# Patient Record
Sex: FEMALE | Race: WHITE | NOT HISPANIC OR LATINO | URBAN - METROPOLITAN AREA
[De-identification: names, ages, dates, MRNs, and addresses within clinical notes are randomized per-mention and may not be internally consistent; named-entity substitution may affect disease eponyms.]

---

## 2022-07-18 ENCOUNTER — OFFICE (OUTPATIENT)
Dept: URBAN - METROPOLITAN AREA CLINIC 9 | Facility: CLINIC | Age: 87
End: 2022-07-18

## 2022-07-18 VITALS
DIASTOLIC BLOOD PRESSURE: 71 MMHG | SYSTOLIC BLOOD PRESSURE: 165 MMHG | HEART RATE: 80 BPM | HEIGHT: 62 IN | DIASTOLIC BLOOD PRESSURE: 72 MMHG | OXYGEN SATURATION: 96 % | SYSTOLIC BLOOD PRESSURE: 171 MMHG | WEIGHT: 148 LBS

## 2022-07-18 DIAGNOSIS — K59.00 CONSTIPATION, UNSPECIFIED: ICD-10-CM

## 2022-07-18 DIAGNOSIS — E07.9 DISORDER OF THYROID, UNSPECIFIED: ICD-10-CM

## 2022-07-18 DIAGNOSIS — E78.00 PURE HYPERCHOLESTEROLEMIA, UNSPECIFIED: ICD-10-CM

## 2022-07-18 DIAGNOSIS — I10 ESSENTIAL (PRIMARY) HYPERTENSION: ICD-10-CM

## 2022-07-18 DIAGNOSIS — R10.32 LEFT LOWER QUADRANT PAIN: ICD-10-CM

## 2022-07-18 DIAGNOSIS — I48.91 UNSPECIFIED ATRIAL FIBRILLATION: ICD-10-CM

## 2022-07-18 PROCEDURE — 99204 OFFICE O/P NEW MOD 45 MIN: CPT | Performed by: NURSE PRACTITIONER

## 2022-07-18 NOTE — SERVICENOTES
Will see back in a few months and see how she is doing on fiber gummies 1-2 per day or Citrucel.  see if this helps with incomplete evacuation but she experiences at times.  No alarming symptoms such as melena, hematochezia, weight loss.  Her left lower quadrant abdominal pain is completely resolved.  If this returns we can proceed with CT imaging plus or minus colonoscopy. normal

## 2022-07-18 NOTE — SERVICEHPINOTES
Ms. Triana  is a pleasant 87-year-old  female with a PMH significant for atrial fibrillation on Eliquis 2.5 mg p.o. b.i.d., essential hypertension, thyroid disease, chronic constipation, hypercholesterolemia, colon polyps.  She presents from PCP office due to intermittent left lower quadrant abdominal pain and constipation.  Patient states that she has had issues with chronic constipation and PCP prescribed Linzess p.o. daily.  Patient states she took for a few days and then stop.  She states she corrected the issue on her own with over-the-counter Metamucil p.o. daily.  She states that this relieved her constipation, but she did notice increase in bloating.  We discussed time Metamucil as the more bloating of the fiber supplements.  She was educated on Citrucel, Benefiber, or fiber gummies instead.  patient states she takes fiber supplementation p.o. daily as needed.  She typically has 1 bowel movement every single day, although she has incomplete evacuation at times.  No melena or hematochezia.  No weight loss.  No nausea, vomiting, heartburn, reflux.  No anorectal pain.  She does have mild and intermittent left lower quadrant abdominal pain, which is actually resolved currently.  No fever, chills, sweats.  No history of diverticulitis.  As above, does take Eliquis p.o. b.i.d. for atrial fibrillation.  No chest pain or shortness of breath currently.  No history of anemia or iron deficiency.  No primary family history of colon cancer, stomach cancer, other GI issues.
br
br   [place]  [place] and unsure by whom or where these were performed.  she states 1 colonoscopy she did have some benign polyps removed and repeat colonoscopy was completely unrevealing.  No polyps.  She was told of a 10 year recall at that time.  Patient states due to age she did not undergo further colonoscopies.

## 2023-04-19 ENCOUNTER — OFFICE (OUTPATIENT)
Dept: URBAN - METROPOLITAN AREA CLINIC 9 | Facility: CLINIC | Age: 88
End: 2023-04-19

## 2023-04-19 VITALS
DIASTOLIC BLOOD PRESSURE: 60 MMHG | SYSTOLIC BLOOD PRESSURE: 181 MMHG | HEART RATE: 58 BPM | HEIGHT: 62 IN | DIASTOLIC BLOOD PRESSURE: 65 MMHG | WEIGHT: 144 LBS | OXYGEN SATURATION: 94 % | SYSTOLIC BLOOD PRESSURE: 160 MMHG

## 2023-04-19 DIAGNOSIS — I48.91 UNSPECIFIED ATRIAL FIBRILLATION: ICD-10-CM

## 2023-04-19 DIAGNOSIS — K59.00 CONSTIPATION, UNSPECIFIED: ICD-10-CM

## 2023-04-19 PROCEDURE — 99213 OFFICE O/P EST LOW 20 MIN: CPT | Performed by: SPECIALIST

## 2023-04-19 NOTE — SERVICEHPINOTES
More constipation and bloating.   Discomfort improves with regularity.   Linzess is too harsh for her.  Has BM every 2-3 days without help.